# Patient Record
Sex: MALE | Race: WHITE | URBAN - METROPOLITAN AREA
[De-identification: names, ages, dates, MRNs, and addresses within clinical notes are randomized per-mention and may not be internally consistent; named-entity substitution may affect disease eponyms.]

---

## 2022-06-16 ENCOUNTER — OFFICE VISIT (OUTPATIENT)
Dept: URBAN - METROPOLITAN AREA CLINIC 40 | Facility: CLINIC | Age: 37
End: 2022-06-16
Payer: COMMERCIAL

## 2022-06-16 ENCOUNTER — WEB ENCOUNTER (OUTPATIENT)
Dept: URBAN - METROPOLITAN AREA CLINIC 40 | Facility: CLINIC | Age: 37
End: 2022-06-16

## 2022-06-16 VITALS
SYSTOLIC BLOOD PRESSURE: 160 MMHG | BODY MASS INDEX: 42.66 KG/M2 | WEIGHT: 315 LBS | TEMPERATURE: 97.3 F | HEIGHT: 72 IN | DIASTOLIC BLOOD PRESSURE: 92 MMHG | HEART RATE: 72 BPM

## 2022-06-16 DIAGNOSIS — K60.2 ANAL FISSURE: ICD-10-CM

## 2022-06-16 DIAGNOSIS — K62.89 ANAL PAIN: ICD-10-CM

## 2022-06-16 DIAGNOSIS — K59.01 CONSTIPATION: ICD-10-CM

## 2022-06-16 DIAGNOSIS — K64.9 HEMORRHOIDS: ICD-10-CM

## 2022-06-16 PROCEDURE — 99204 OFFICE O/P NEW MOD 45 MIN: CPT | Performed by: PHYSICIAN ASSISTANT

## 2022-06-16 RX ORDER — LIDOCAINE HCL AND HYDROCORTISONE ACETATE 30; 5 MG/G; MG/G
1 APPLICATION CREAM RECTAL; TOPICAL TWICE A DAY
Status: ACTIVE | COMMUNITY

## 2022-06-16 RX ORDER — DOXYCYCLINE HYCLATE 100 MG/1
1 CAPSULE CAPSULE, GELATIN COATED ORAL ONCE A DAY
Status: ACTIVE | COMMUNITY

## 2022-06-16 RX ORDER — POLYETHYLENE GLYCOL 3350 17 G/17G
AS DIRECTED POWDER, FOR SOLUTION ORAL
Status: ACTIVE | COMMUNITY

## 2022-06-16 NOTE — PHYSICAL EXAM GASTROINTESTINAL
Abdomen , soft, nontender, nondistended , no guarding or rigidity , no masses palpable , normal bowel sounds , Liver and Spleen , no hepatomegaly present , no hepatosplenomegaly , liver nontender , spleen not palpable , Rectal (April, MA in room): No external hemorrhoids or rectal bleeding, + anal fissure, prolapsing and reducible internal hemorrhoids. No evidence of thrombosed hemorrhoids. DONNELL deferred.

## 2022-06-16 NOTE — HPI-TODAY'S VISIT:
Mr. Garcia is a 36-year-old white male who presents to the office today with complaint of anal and rectal pain.  He has a history of morbid obesity, recent mild constipation last 2 weeks, MiraLAX recommended.  He is a smoker and occasionally in occasional alcohol use reported.  He states in the last 2 weeks he has had increasing rectal pain and oftentimes has pain with sitting on his bottom for prolonged periods of time.  Work from home.  He has had minimal rectal bleeding with any bowel movements and believes he has had hemorrhoids in the past but that this feels different.  He was seen in the local urgent care and had an anal and digital rectal exam which was reportedly normal by the provider.  However he was given empiric lidocaine and hydrocortisone cream.  He has tried preparation H in the past with only mild improvement.  Topical lidocaine only helping mildly.  No significant abdominal pain.  Denies nausea vomiting or fever chills.  Good appetite and weight stable.  He is trying to eat more dietary fiber and drink more water.

## 2022-07-11 ENCOUNTER — OFFICE VISIT (OUTPATIENT)
Dept: URBAN - METROPOLITAN AREA CLINIC 40 | Facility: CLINIC | Age: 37
End: 2022-07-11
Payer: COMMERCIAL

## 2022-07-11 VITALS
DIASTOLIC BLOOD PRESSURE: 90 MMHG | WEIGHT: 315 LBS | HEIGHT: 72 IN | TEMPERATURE: 98.2 F | HEART RATE: 98 BPM | BODY MASS INDEX: 42.66 KG/M2 | SYSTOLIC BLOOD PRESSURE: 140 MMHG

## 2022-07-11 DIAGNOSIS — K60.2 ANAL FISSURE: ICD-10-CM

## 2022-07-11 DIAGNOSIS — K59.01 CONSTIPATION: ICD-10-CM

## 2022-07-11 DIAGNOSIS — K64.9 HEMORRHOIDS: ICD-10-CM

## 2022-07-11 DIAGNOSIS — K62.89 ANAL PAIN: ICD-10-CM

## 2022-07-11 PROCEDURE — 99213 OFFICE O/P EST LOW 20 MIN: CPT | Performed by: PHYSICIAN ASSISTANT

## 2022-07-11 RX ORDER — LIDOCAINE HCL AND HYDROCORTISONE ACETATE 30; 5 MG/G; MG/G
1 APPLICATION CREAM RECTAL; TOPICAL TWICE A DAY
Status: ACTIVE | COMMUNITY

## 2022-07-11 RX ORDER — DOXYCYCLINE HYCLATE 100 MG/1
1 CAPSULE CAPSULE, GELATIN COATED ORAL ONCE A DAY
Status: ACTIVE | COMMUNITY

## 2022-07-11 RX ORDER — POLYETHYLENE GLYCOL 3350 17 G/17G
AS DIRECTED POWDER, FOR SOLUTION ORAL
Status: ACTIVE | COMMUNITY

## 2022-07-11 NOTE — HPI-TODAY'S VISIT:
Mr. Garcia is a 36-year-old white male seen 6/16/22 with complaint of anal and rectal pain.  He has a history of morbid obesity, recent mild constipation last 2 weeks, MiraLAX recommended.  He is a smoker and occasionally in occasional alcohol use reported.  He states in the last 2 weeks he has had increasing rectal pain and oftentimes has pain with sitting on his bottom for prolonged periods of time.  Work from home.  He has had minimal rectal bleeding with any bowel movements and believes he has had hemorrhoids in the past but that this feels different.  He was seen in the local urgent care and had an anal and digital rectal exam which was reportedly normal by the provider.  However he was given empiric lidocaine and hydrocortisone cream.  He has tried preparation H in the past with only mild improvement.  Topical lidocaine only helping mildly.  No significant abdominal pain.  Denies nausea vomiting or fever chills.  Good appetite and weight stable.  He is trying to eat more dietary fiber and drink more water. Overall, he has seen significant improvement with nifedipine ointment and is out of lidocaine/prilocaine topical.  He has had significantly less bleeding and only 4 episodes of, small streaking of bright red blood.  No melena.  Denies abdominal pain.  No new complaints today.  He does believe the nifedipine is helped and he is using it daily after each bowel movement.  Admits that his stool has softened significantly and he is not straining with stool.  However, he has persistent burning and maybe some itching in the area despite topical therapy.  He will be traveling to [place] on tomorrow will be out of town for 2 weeks.

## 2022-07-11 NOTE — PHYSICAL EXAM GASTROINTESTINAL
Abdomen , soft, nontender, nondistended , no guarding or rigidity , no masses palpable , normal bowel sounds , Liver and Spleen , no hepatomegaly present , no hepatosplenomegaly , liver nontender , spleen not palpable , Rectal Exam 6/16/2022 (April, MA in room): No external hemorrhoids or rectal bleeding, + anal fissure, prolapsing and reducible internal hemorrhoids. No evidence of thrombosed hemorrhoids. DONNELL deferred.

## 2022-07-22 ENCOUNTER — TELEPHONE ENCOUNTER (OUTPATIENT)
Dept: URBAN - METROPOLITAN AREA CLINIC 74 | Facility: CLINIC | Age: 37
End: 2022-07-22

## 2022-07-25 ENCOUNTER — OFFICE VISIT (OUTPATIENT)
Dept: URBAN - METROPOLITAN AREA CLINIC 74 | Facility: CLINIC | Age: 37
End: 2022-07-25
Payer: COMMERCIAL

## 2022-07-25 VITALS
WEIGHT: 315 LBS | TEMPERATURE: 98.1 F | HEIGHT: 72 IN | HEART RATE: 73 BPM | DIASTOLIC BLOOD PRESSURE: 100 MMHG | SYSTOLIC BLOOD PRESSURE: 150 MMHG | OXYGEN SATURATION: 97 % | BODY MASS INDEX: 42.66 KG/M2

## 2022-07-25 DIAGNOSIS — K64.9 HEMORRHOIDS: ICD-10-CM

## 2022-07-25 DIAGNOSIS — K59.01 CONSTIPATION: ICD-10-CM

## 2022-07-25 DIAGNOSIS — K62.89 ANAL PAIN: ICD-10-CM

## 2022-07-25 DIAGNOSIS — K60.2 ANAL FISSURE: ICD-10-CM

## 2022-07-25 PROCEDURE — 99213 OFFICE O/P EST LOW 20 MIN: CPT | Performed by: INTERNAL MEDICINE

## 2022-07-25 RX ORDER — LIDOCAINE HCL AND HYDROCORTISONE ACETATE 30; 5 MG/G; MG/G
1 APPLICATION CREAM RECTAL; TOPICAL TWICE A DAY
Status: ACTIVE | COMMUNITY

## 2022-07-25 RX ORDER — POLYETHYLENE GLYCOL 3350 17 G/17G
AS DIRECTED POWDER, FOR SOLUTION ORAL
Status: ACTIVE | COMMUNITY

## 2022-07-25 RX ORDER — DOXYCYCLINE HYCLATE 100 MG/1
1 CAPSULE CAPSULE, GELATIN COATED ORAL ONCE A DAY
Status: ACTIVE | COMMUNITY

## 2022-07-25 NOTE — PHYSICAL EXAM CONSTITUTIONAL:
morbidly obese , in no acute distress , ambulating without difficulty , normal communication ability

## 2022-07-25 NOTE — PHYSICAL EXAM GASTROINTESTINAL
Abdomen , soft, nontender, nondistended , no guarding or rigidity , no masses palpable , normal bowel sounds , Liver and Spleen , no hepatomegaly present , no hepatosplenomegaly , liver nontender , spleen not palpable , Rectal posterior anal fissure/moderate anal spasms/external skin tags/hemorrhoids. tender on rectal exam

## 2022-07-25 NOTE — HPI-TODAY'S VISIT:
37 yo male here with severe rectal pain. h/o anal fissure/hemorrhoids. He was initially responding to nifedipine with Xylocaine, but unfortunately it does not help him anymore.  Went to the ER yesterday.  He was given narcotics and MiraLAX.  He has chronic constipation.  MiraLAX once a day helps some but not very much.  I have encouraged him to increase the dose of the MiraLAX.

## 2022-07-28 ENCOUNTER — DASHBOARD ENCOUNTERS (OUTPATIENT)
Age: 37
End: 2022-07-28

## 2022-07-28 PROBLEM — 14760008 CONSTIPATION: Status: ACTIVE | Noted: 2022-06-16

## 2022-08-22 ENCOUNTER — OFFICE VISIT (OUTPATIENT)
Dept: URBAN - METROPOLITAN AREA CLINIC 74 | Facility: CLINIC | Age: 37
End: 2022-08-22

## 2022-09-19 ENCOUNTER — OFFICE VISIT (OUTPATIENT)
Dept: URBAN - METROPOLITAN AREA CLINIC 74 | Facility: CLINIC | Age: 37
End: 2022-09-19

## 2022-10-11 ENCOUNTER — OFFICE VISIT (OUTPATIENT)
Dept: URBAN - METROPOLITAN AREA CLINIC 40 | Facility: CLINIC | Age: 37
End: 2022-10-11

## 2022-10-25 ENCOUNTER — OFFICE VISIT (OUTPATIENT)
Dept: URBAN - METROPOLITAN AREA CLINIC 40 | Facility: CLINIC | Age: 37
End: 2022-10-25